# Patient Record
Sex: FEMALE | Race: WHITE
[De-identification: names, ages, dates, MRNs, and addresses within clinical notes are randomized per-mention and may not be internally consistent; named-entity substitution may affect disease eponyms.]

---

## 2017-12-15 ENCOUNTER — HOSPITAL ENCOUNTER (EMERGENCY)
Dept: HOSPITAL 17 - NEPE | Age: 47
LOS: 1 days | Discharge: HOME | End: 2017-12-16
Payer: COMMERCIAL

## 2017-12-15 VITALS
HEART RATE: 98 BPM | OXYGEN SATURATION: 99 % | SYSTOLIC BLOOD PRESSURE: 159 MMHG | RESPIRATION RATE: 18 BRPM | DIASTOLIC BLOOD PRESSURE: 97 MMHG

## 2017-12-15 VITALS — BODY MASS INDEX: 18.73 KG/M2 | WEIGHT: 99.21 LBS | HEIGHT: 61 IN

## 2017-12-15 VITALS
DIASTOLIC BLOOD PRESSURE: 82 MMHG | OXYGEN SATURATION: 99 % | SYSTOLIC BLOOD PRESSURE: 170 MMHG | TEMPERATURE: 97.7 F | RESPIRATION RATE: 16 BRPM | HEART RATE: 103 BPM

## 2017-12-15 DIAGNOSIS — F17.210: ICD-10-CM

## 2017-12-15 DIAGNOSIS — F32.9: ICD-10-CM

## 2017-12-15 DIAGNOSIS — J44.9: ICD-10-CM

## 2017-12-15 DIAGNOSIS — Z98.890: ICD-10-CM

## 2017-12-15 DIAGNOSIS — B34.9: Primary | ICD-10-CM

## 2017-12-15 DIAGNOSIS — Z86.718: ICD-10-CM

## 2017-12-15 DIAGNOSIS — F41.9: ICD-10-CM

## 2017-12-15 LAB
ALBUMIN SERPL-MCNC: 3.3 GM/DL (ref 3.4–5)
ALP SERPL-CCNC: 92 U/L (ref 45–117)
ALT SERPL-CCNC: 21 U/L (ref 10–53)
AST SERPL-CCNC: 18 U/L (ref 15–37)
BACTERIA #/AREA URNS HPF: (no result) /HPF
BASOPHILS # BLD AUTO: 0.1 TH/MM3 (ref 0–0.2)
BASOPHILS NFR BLD: 0.9 % (ref 0–2)
BILIRUB SERPL-MCNC: 0.2 MG/DL (ref 0.2–1)
BUN SERPL-MCNC: 6 MG/DL (ref 7–18)
CALCIUM SERPL-MCNC: 8.3 MG/DL (ref 8.5–10.1)
CHLORIDE SERPL-SCNC: 111 MEQ/L (ref 98–107)
COLOR UR: (no result)
CREAT SERPL-MCNC: 0.54 MG/DL (ref 0.5–1)
EOSINOPHIL # BLD: 0.2 TH/MM3 (ref 0–0.4)
EOSINOPHIL NFR BLD: 4.1 % (ref 0–4)
ERYTHROCYTE [DISTWIDTH] IN BLOOD BY AUTOMATED COUNT: 18.3 % (ref 11.6–17.2)
GFR SERPLBLD BASED ON 1.73 SQ M-ARVRAT: 122 ML/MIN (ref 89–?)
GLUCOSE SERPL-MCNC: 74 MG/DL (ref 74–106)
GLUCOSE UR STRIP-MCNC: (no result) MG/DL
HCO3 BLD-SCNC: 22.7 MEQ/L (ref 21–32)
HCT VFR BLD CALC: 35.2 % (ref 35–46)
HGB BLD-MCNC: 11.7 GM/DL (ref 11.6–15.3)
HGB UR QL STRIP: (no result)
INR PPP: 1 RATIO
KETONES UR STRIP-MCNC: (no result) MG/DL
LIPASE: 172 U/L (ref 73–393)
LYMPHOCYTES # BLD AUTO: 2 TH/MM3 (ref 1–4.8)
LYMPHOCYTES NFR BLD AUTO: 35.3 % (ref 9–44)
MCH RBC QN AUTO: 27.6 PG (ref 27–34)
MCHC RBC AUTO-ENTMCNC: 33.2 % (ref 32–36)
MCV RBC AUTO: 83.1 FL (ref 80–100)
MONOCYTE #: 0.4 TH/MM3 (ref 0–0.9)
MONOCYTES NFR BLD: 6.5 % (ref 0–8)
NEUTROPHILS # BLD AUTO: 3.1 TH/MM3 (ref 1.8–7.7)
NEUTROPHILS NFR BLD AUTO: 53.2 % (ref 16–70)
NITRITE UR QL STRIP: (no result)
PLATELET # BLD: 297 TH/MM3 (ref 150–450)
PMV BLD AUTO: 7.5 FL (ref 7–11)
PROT SERPL-MCNC: 7.4 GM/DL (ref 6.4–8.2)
PROTHROMBIN TIME: 10.1 SEC (ref 9.8–11.6)
RBC # BLD AUTO: 4.23 MIL/MM3 (ref 4–5.3)
SODIUM SERPL-SCNC: 141 MEQ/L (ref 136–145)
SP GR UR STRIP: 1.01 (ref 1–1.03)
SQUAMOUS #/AREA URNS HPF: 4 /HPF (ref 0–5)
URINE LEUKOCYTE ESTERASE: (no result)
WBC # BLD AUTO: 5.7 TH/MM3 (ref 4–11)

## 2017-12-15 PROCEDURE — 80053 COMPREHEN METABOLIC PANEL: CPT

## 2017-12-15 PROCEDURE — 99284 EMERGENCY DEPT VISIT MOD MDM: CPT

## 2017-12-15 PROCEDURE — 85025 COMPLETE CBC W/AUTO DIFF WBC: CPT

## 2017-12-15 PROCEDURE — 85730 THROMBOPLASTIN TIME PARTIAL: CPT

## 2017-12-15 PROCEDURE — 81001 URINALYSIS AUTO W/SCOPE: CPT

## 2017-12-15 PROCEDURE — 87804 INFLUENZA ASSAY W/OPTIC: CPT

## 2017-12-15 PROCEDURE — 83690 ASSAY OF LIPASE: CPT

## 2017-12-15 PROCEDURE — 71010: CPT

## 2017-12-15 PROCEDURE — 74000: CPT

## 2017-12-15 PROCEDURE — 85610 PROTHROMBIN TIME: CPT

## 2017-12-15 NOTE — PD
HPI


Chief Complaint:  Abdominal Pain


Time Seen by Provider:  21:29


Travel History


International Travel<30 days:  No


Contact w/Intl Traveler<30days:  No


Traveled to known affect area:  No





History of Present Illness


HPI


47-year-old female presents emergency department for evaluation of upper 

respiratory symptoms 2 weeks, cough, sore throat, nasal congestion.  She is 

unsure if she is had any fevers due to lack a thermometer.  She has an old 

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  She still smokes 4 

cigarettes a day.  She is also reporting abdominal pain 2 days.  Patient 

states she had a ruptured intestine in  and since that surgery she 

consistently has nausea, vomiting and diarrhea.  Patient states she has not 

eaten since pain started 2 days ago so she is unsure if eating exacerbates the 

pain.  The patient states the last time she felt this pain was when her bowel 

was perforated.  Patient denies any hematemesis or blood in her stool.  She 

denies any dysuria or hematuria.  She denies any vaginal discharge or vaginal 

bleeding.  Patient states her boyfriend was recently diagnosed with hepatitis C 

and she is very concerned that she may be infected.





PFSH


Past Medical History


Hx Anticoagulant Therapy:  No


Autoimmune Disease:  No


Anxiety:  Yes


Depression:  Yes


Cancer:  No


Cardiovascular Problems:  Yes


Chemotherapy:  No


COPD:  Yes (PPD SMOKER)


Cerebrovascular Accident:  No


Diabetes:  No


Deep Vein Thrombosis:  Yes


Endocrine:  No


Immune Disorder:  No


Implanted Vascular Access Dvce:  No


Neurologic:  No


Psychiatric:  No


Respiratory:  Yes (COPD)


Immunizations Current:  Yes


Thyroid Disease:  No


Ulcer:  Yes (REPAIR IN )


Tetanus Vaccination:  > 5 Years


Influenza Vaccination:  No


Pregnant?:  Unknown


Menopausal:  Yes


:  2


Para:  2


Miscarriage:  0


Tubal Ligation:  Yes





Past Surgical History


Abdominal Surgery:  Yes


Cholecystectomy:  Yes ( removed half stomach from perforated ulcers)


Genitourinary Surgery:  Yes


Hysterectomy:  Yes ()


Thoracic Surgery:  Yes (RT MID/LOWER LOBECTOMY )


Valve Replacement:  Yes (right ivc filter, right lower lobes lung gone r/t clot)


Other Surgery:  Yes (right mid and lower lobectomy )





Social History


Alcohol Use:  Yes (rare)


Tobacco Use:  Yes ( pack a day )


Substance Use:  No





Allergies-Medications


(Allergen,Severity, Reaction):  


Coded Allergies:  


     No Known Allergies (Unverified  Adverse Reaction, Unknown, 12/15/17)


Reported Meds & Prescriptions





Reported Meds & Active Scripts


Active


Azithromycin 250 Mg Tab 250 Mg PO AS DIRECTED


     Take 2 tabs (500 mg) on day 1 then 1 tab daily x 4 days.


Tramadol (Tramadol HCl) 50 Mg Tab 50 Mg PO Q6H PRN


Ancef (Cefazolin) 1 gram Inj 1 Gm IVPB Q8H 14 Days


Hydrocodone/Acetaminophen 5 mg/325 mg 1 Tab 1 Tab PO Q4H PRN 1 Days


Ferrous Sulfate 325 Mg Tab 325 Mg PO BID@12,17 30 Days


Librium 25 mg Cap (Chlordiazepoxide) 25 Mg Cap 25 Mg PO Q8HR 1 Days


Protonix (Pantoprazole Sodium) 40 Mg Tabdr 40 Mg PO Q12HR


Reported


Zoloft (Sertraline HCl) 50 Mg Tab 50 Mg PO DAILY


Alprazolam 2 Mg Tab 2 Mg PO BID PRN


[Oxygen 2 L]     AT NIGHT


[Nebulizer]     


Advair Hfa 115/21 (Fluticasone-Salmeterol  mcg/21 mcg) Fluticasone/

Salmeterol 115/21 Inh 1 Puff PO BID








Review of Systems


Except as stated in HPI:  all other systems reviewed are Neg





Physical Exam


Narrative


GENERAL: Appears older than stated age, pale and frail 47-year-old female 

patient in no acute respiratory distress.


SKIN: Focused skin assessment warm/dry.


HEAD: Normocephalic.  Atraumatic.


EYES: No scleral icterus. No injection or drainage.


THROAT: Mild pharyngeal injection, No exudates. Airway is patent. 


ENT: Mucosa pink and moist. Airway patent. Nasal turbinates appear mildly 

hypertrophic without nasal blood, purulent drainage or septal hematoma.


NECK: Supple, trachea midline. No JVD or lymphadenopathy.


CARDIOVASCULAR: Regular rate and rhythm without murmurs, gallops, or rubs. 


RESPIRATORY: Breath sounds equal bilaterally. No accessory muscle use.


GASTROINTESTINAL: Abdomen soft, bilateral lower quadrant tenderness with 

palpation, no masses noted, nondistended. 


MUSCULOSKELETAL: No cyanosis, or edema. 


BACK: Nontender without obvious deformity. No CVA tenderness.





Data


Data


Last Documented VS





Vital Signs








  Date Time  Temp Pulse Resp B/P (MAP) Pulse Ox O2 Delivery O2 Flow Rate FiO2


 


17 04:53        


 


17 02:00  80 16  98 Room Air  


 


12/15/17 20:20 97.7       








Orders





 Orders


Complete Blood Count With Diff (12/15/17 21:47)


Comprehensive Metabolic Panel (12/15/17 21:47)


Lipase (12/15/17 21:47)


Prothrombin Time / Inr (Pt) (12/15/17 21:47)


Act Partial Throm Time (Ptt) (12/15/17 21:47)


Urinalysis - C+S If Indicated (12/15/17 21:47)


Iv Access Insert/Monitor (12/15/17 21:47)


Ecg Monitoring (12/15/17 21:47)


Sodium Chlor 0.9% 1000 Ml Inj (Ns 1000 M (12/15/17 21:47)


Influenzae A/B Antigen (12/15/17 21:48)


Chest, Single Ap (12/15/17 21:48)


Abdomen, Upright Only (12/15/17 22:02)





Labs





Laboratory Tests








Test


  12/15/17


22:00


 


White Blood Count 5.7 TH/MM3 


 


Red Blood Count 4.23 MIL/MM3 


 


Hemoglobin 11.7 GM/DL 


 


Hematocrit 35.2 % 


 


Mean Corpuscular Volume 83.1 FL 


 


Mean Corpuscular Hemoglobin 27.6 PG 


 


Mean Corpuscular Hemoglobin


Concent 33.2 % 


 


 


Red Cell Distribution Width 18.3 % 


 


Platelet Count 297 TH/MM3 


 


Mean Platelet Volume 7.5 FL 


 


Neutrophils (%) (Auto) 53.2 % 


 


Lymphocytes (%) (Auto) 35.3 % 


 


Monocytes (%) (Auto) 6.5 % 


 


Eosinophils (%) (Auto) 4.1 % 


 


Basophils (%) (Auto) 0.9 % 


 


Neutrophils # (Auto) 3.1 TH/MM3 


 


Lymphocytes # (Auto) 2.0 TH/MM3 


 


Monocytes # (Auto) 0.4 TH/MM3 


 


Eosinophils # (Auto) 0.2 TH/MM3 


 


Basophils # (Auto) 0.1 TH/MM3 


 


CBC Comment DIFF FINAL 


 


Differential Comment  


 


Prothrombin Time 10.1 SEC 


 


Prothromb Time International


Ratio 1.0 RATIO 


 


 


Activated Partial


Thromboplast Time 25.7 SEC 


 


 


Urine Color LIGHT-YELLOW 


 


Urine Turbidity HAZY 


 


Urine pH 5.5 


 


Urine Specific Gravity 1.006 


 


Urine Protein NEG mg/dL 


 


Urine Glucose (UA) NEG mg/dL 


 


Urine Ketones NEG mg/dL 


 


Urine Occult Blood TRACE 


 


Urine Nitrite NEG 


 


Urine Bilirubin NEG 


 


Urine Urobilinogen


  LESS THAN 2.0


MG/DL


 


Urine Leukocyte Esterase NEG 


 


Urine RBC 1 /hpf 


 


Urine WBC 3 /hpf 


 


Urine Squamous Epithelial


Cells 4 /hpf 


 


 


Urine Bacteria RARE /hpf 


 


Microscopic Urinalysis Comment


  CULT NOT


INDICATED


 


Blood Urea Nitrogen 6 MG/DL 


 


Creatinine 0.54 MG/DL 


 


Random Glucose 74 MG/DL 


 


Total Protein 7.4 GM/DL 


 


Albumin 3.3 GM/DL 


 


Calcium Level 8.3 MG/DL 


 


Alkaline Phosphatase 92 U/L 


 


Aspartate Amino Transf


(AST/SGOT) 18 U/L 


 


 


Alanine Aminotransferase


(ALT/SGPT) 21 U/L 


 


 


Total Bilirubin 0.2 MG/DL 


 


Sodium Level 141 MEQ/L 


 


Potassium Level 3.6 MEQ/L 


 


Chloride Level 111 MEQ/L 


 


Carbon Dioxide Level 22.7 MEQ/L 


 


Anion Gap 7 MEQ/L 


 


Estimat Glomerular Filtration


Rate 122 ML/MIN 


 


 


Lipase 172 U/L 











[facility]


Medical Decision Making


Medical Screen Exam Complete:  Yes


Emergency Medical Condition:  Yes


Differential Diagnosis


Differential diagnoses include but not limited to gastroenteritis, perforated 

bowel, chronic abdominal pain, sinusitis, influenza, bronchitis


Narrative Course


Placed on monitor, IV obtained and blood works in the lab, CBC, CMP, lipase, PT 

INR, UA ordered and pending.  Influenza ordered and pending.  Chest x-ray 

ordered and pending.  Upright abdomen x-ray ordered and pending.  1 L normal 

saline bolus given.  Blood work shows no acute abnormality, liver enzymes were 

elevated, UA shows no acute abnormality.  Dr. Campos assumes care for this 

patient.  Please see his documentation for further details and disposition.








Laboratory Tests








Test


  12/15/17


22:00


 


White Blood Count 5.7 TH/MM3 


 


Red Blood Count 4.23 MIL/MM3 


 


Hemoglobin 11.7 GM/DL 


 


Hematocrit 35.2 % 


 


Mean Corpuscular Volume 83.1 FL 


 


Mean Corpuscular Hemoglobin 27.6 PG 


 


Mean Corpuscular Hemoglobin


Concent 33.2 % 


 


 


Red Cell Distribution Width 18.3 % 


 


Platelet Count 297 TH/MM3 


 


Mean Platelet Volume 7.5 FL 


 


Neutrophils (%) (Auto) 53.2 % 


 


Lymphocytes (%) (Auto) 35.3 % 


 


Monocytes (%) (Auto) 6.5 % 


 


Eosinophils (%) (Auto) 4.1 % 


 


Basophils (%) (Auto) 0.9 % 


 


Neutrophils # (Auto) 3.1 TH/MM3 


 


Lymphocytes # (Auto) 2.0 TH/MM3 


 


Monocytes # (Auto) 0.4 TH/MM3 


 


Eosinophils # (Auto) 0.2 TH/MM3 


 


Basophils # (Auto) 0.1 TH/MM3 


 


CBC Comment DIFF FINAL 


 


Differential Comment  


 


Prothrombin Time 10.1 SEC 


 


Prothromb Time International


Ratio 1.0 RATIO 


 


 


Activated Partial


Thromboplast Time 25.7 SEC 


 


 


Urine Color LIGHT-YELLOW 


 


Urine Turbidity HAZY 


 


Urine pH 5.5 


 


Urine Specific Gravity 1.006 


 


Urine Protein NEG mg/dL 


 


Urine Glucose (UA) NEG mg/dL 


 


Urine Ketones NEG mg/dL 


 


Urine Occult Blood TRACE 


 


Urine Nitrite NEG 


 


Urine Bilirubin NEG 


 


Urine Urobilinogen


  LESS THAN 2.0


MG/DL


 


Urine Leukocyte Esterase NEG 


 


Urine RBC 1 /hpf 


 


Urine WBC 3 /hpf 


 


Urine Squamous Epithelial


Cells 4 /hpf 


 


 


Urine Bacteria RARE /hpf 


 


Microscopic Urinalysis Comment


  CULT NOT


INDICATED


 


Blood Urea Nitrogen 6 MG/DL 


 


Creatinine 0.54 MG/DL 


 


Random Glucose 74 MG/DL 


 


Total Protein 7.4 GM/DL 


 


Albumin 3.3 GM/DL 


 


Calcium Level 8.3 MG/DL 


 


Alkaline Phosphatase 92 U/L 


 


Aspartate Amino Transf


(AST/SGOT) 18 U/L 


 


 


Alanine Aminotransferase


(ALT/SGPT) 21 U/L 


 


 


Total Bilirubin 0.2 MG/DL 


 


Sodium Level 141 MEQ/L 


 


Potassium Level 3.6 MEQ/L 


 


Chloride Level 111 MEQ/L 


 


Carbon Dioxide Level 22.7 MEQ/L 


 


Anion Gap 7 MEQ/L 


 


Estimat Glomerular Filtration


Rate 122 ML/MIN 


 


 


Lipase 172 U/L 














Last Impressions








Abdomen X-Ray 12/15/17 2202 Signed





Impressions: 





 Service Date/Time:  Friday, December 15, 2017 22:16 - CONCLUSION:  1. No acute 





 findings.     Willie Duran MD 


 


Chest X-Ray 12/15/17 2148 Signed





Impressions: 





 Service Date/Time:  Friday, December 15, 2017 22:13 - CONCLUSION:  1. Healing 





 right sided rib fractures. Stable perihilar parenchymal opacities bilaterally 





 since prior examinations.     Willie Duran MD 








Scripts


Azithromycin (Azithromycin) 250 Mg Tab


250 MG PO AS DIRECTED for Infection, #6 TAB 0 Refills


   Take 2 tabs (500 mg) on day 1 then 1 tab daily x 4 days.


   Prov: Shamir Campos MD         17 


Tramadol (Tramadol) 50 Mg Tab


50 MG PO Q6H Y for PAIN, #10 TAB 0 Refills


   Prov: Shamir Campos MD         17











Eli Camarillo Dec 15, 2017 21:50

## 2017-12-15 NOTE — RADRPT
EXAM DATE/TIME:  12/15/2017 22:13 

 

[facility] COMPARISON:     

CHEST SINGLE AP, December 09, 2015, 2:50.

 

                     

INDICATIONS :     

Patient complains of shortness of breath. 

                     

 

MEDICAL HISTORY :            

Sepsis. Gerd   

 

SURGICAL HISTORY :        

Tubal ligation. Lobe resection/ Rt wrist.

 

ENCOUNTER:     

Initial                                        

 

ACUITY:     

2 days      

 

PAIN SCORE:     

0/10

 

LOCATION:      

chest 

 

FINDINGS:     

Comparison is to 2015. Remote right rib fractures and chronic parenchymal opacity in the right mid manuel
ng, presumably scarring similar to examination from 2 years ago. Linear scarring also present upper l
eft lung. No effusion or pneumothorax. Heart size normal.

 

CONCLUSION:     

1. Healing right sided rib fractures. Stable perihilar parenchymal opacities bilaterally since prior 
examinations.

 

 

 

 Willie Duran MD on December 15, 2017 at 22:42           

Board Certified Radiologist.

 This report was verified electronically.

## 2017-12-15 NOTE — RADRPT
EXAM DATE/TIME:  12/15/2017 22:16 

 

[facility] COMPARISON:     

No previous studies available for comparison.

 

                     

INDICATIONS :     

Patient complains of upper abdomen.

                     

 

MEDICAL HISTORY :     

None.          

 

SURGICAL HISTORY :     

None.   

 

ENCOUNTER:     

Initial                                        

 

ACUITY:     

1 day      

 

PAIN SCORE:     

4/10

 

LOCATION:       

Abdomen

 

FINDINGS:     

A single erect view of the abdomen demonstrates the lower lungs to be clear. Multiple healing right r
ib fractures. Inferior vena cava filter noted.

 

CONCLUSION:     

1. No acute findings.

 

 

 

 Willie Duran MD on December 15, 2017 at 22:44           

Board Certified Radiologist.

 This report was verified electronically.

## 2017-12-16 VITALS
DIASTOLIC BLOOD PRESSURE: 78 MMHG | HEART RATE: 80 BPM | OXYGEN SATURATION: 98 % | SYSTOLIC BLOOD PRESSURE: 147 MMHG | RESPIRATION RATE: 16 BRPM

## 2017-12-16 NOTE — PD
Physical Exam


Date Seen by Provider:  Dec 16, 2017


Time Seen by Provider:  12:30


Narrative


Patient has lower left quadrant pain as well as upper congestion and flulike 

symptoms patient's flu was negative she was seen by the PA I am discharging the 

patient with a Z-Ladarius and Tylenol and a few tramadol for her pain and she is 

going to follow-up as outpatient.





Data


Data


Last Documented VS





Vital Signs








  Date Time  Temp Pulse Resp B/P (MAP) Pulse Ox O2 Delivery O2 Flow Rate FiO2


 


12/16/17 04:53        


 


12/16/17 02:00  80 16  98 Room Air  


 


12/15/17 20:20 97.7       








Orders





 Orders


Complete Blood Count With Diff (12/15/17 21:47)


Comprehensive Metabolic Panel (12/15/17 21:47)


Lipase (12/15/17 21:47)


Prothrombin Time / Inr (Pt) (12/15/17 21:47)


Act Partial Throm Time (Ptt) (12/15/17 21:47)


Urinalysis - C+S If Indicated (12/15/17 21:47)


Iv Access Insert/Monitor (12/15/17 21:47)


Ecg Monitoring (12/15/17 21:47)


Sodium Chlor 0.9% 1000 Ml Inj (Ns 1000 M (12/15/17 21:47)


Influenzae A/B Antigen (12/15/17 21:48)


Chest, Single Ap (12/15/17 21:48)


Abdomen, Upright Only (12/15/17 22:02)





Labs





Laboratory Tests








Test


  12/15/17


22:00


 


White Blood Count 5.7 TH/MM3 


 


Red Blood Count 4.23 MIL/MM3 


 


Hemoglobin 11.7 GM/DL 


 


Hematocrit 35.2 % 


 


Mean Corpuscular Volume 83.1 FL 


 


Mean Corpuscular Hemoglobin 27.6 PG 


 


Mean Corpuscular Hemoglobin


Concent 33.2 % 


 


 


Red Cell Distribution Width 18.3 % 


 


Platelet Count 297 TH/MM3 


 


Mean Platelet Volume 7.5 FL 


 


Neutrophils (%) (Auto) 53.2 % 


 


Lymphocytes (%) (Auto) 35.3 % 


 


Monocytes (%) (Auto) 6.5 % 


 


Eosinophils (%) (Auto) 4.1 % 


 


Basophils (%) (Auto) 0.9 % 


 


Neutrophils # (Auto) 3.1 TH/MM3 


 


Lymphocytes # (Auto) 2.0 TH/MM3 


 


Monocytes # (Auto) 0.4 TH/MM3 


 


Eosinophils # (Auto) 0.2 TH/MM3 


 


Basophils # (Auto) 0.1 TH/MM3 


 


CBC Comment DIFF FINAL 


 


Differential Comment  


 


Prothrombin Time 10.1 SEC 


 


Prothromb Time International


Ratio 1.0 RATIO 


 


 


Activated Partial


Thromboplast Time 25.7 SEC 


 


 


Urine Color LIGHT-YELLOW 


 


Urine Turbidity HAZY 


 


Urine pH 5.5 


 


Urine Specific Gravity 1.006 


 


Urine Protein NEG mg/dL 


 


Urine Glucose (UA) NEG mg/dL 


 


Urine Ketones NEG mg/dL 


 


Urine Occult Blood TRACE 


 


Urine Nitrite NEG 


 


Urine Bilirubin NEG 


 


Urine Urobilinogen


  LESS THAN 2.0


MG/DL


 


Urine Leukocyte Esterase NEG 


 


Urine RBC 1 /hpf 


 


Urine WBC 3 /hpf 


 


Urine Squamous Epithelial


Cells 4 /hpf 


 


 


Urine Bacteria RARE /hpf 


 


Microscopic Urinalysis Comment


  CULT NOT


INDICATED


 


Blood Urea Nitrogen 6 MG/DL 


 


Creatinine 0.54 MG/DL 


 


Random Glucose 74 MG/DL 


 


Total Protein 7.4 GM/DL 


 


Albumin 3.3 GM/DL 


 


Calcium Level 8.3 MG/DL 


 


Alkaline Phosphatase 92 U/L 


 


Aspartate Amino Transf


(AST/SGOT) 18 U/L 


 


 


Alanine Aminotransferase


(ALT/SGPT) 21 U/L 


 


 


Total Bilirubin 0.2 MG/DL 


 


Sodium Level 141 MEQ/L 


 


Potassium Level 3.6 MEQ/L 


 


Chloride Level 111 MEQ/L 


 


Carbon Dioxide Level 22.7 MEQ/L 


 


Anion Gap 7 MEQ/L 


 


Estimat Glomerular Filtration


Rate 122 ML/MIN 


 


 


Lipase 172 U/L 











MDM


Supervised Visit with ALEX:  Yes


Diagnosis





 Primary Impression:  


 Viral syndrome


 Additional Impression:  


 Abdominal pain


Scripts


Azithromycin (Azithromycin) 250 Mg Tab


250 MG PO AS DIRECTED for Infection, #6 TAB 0 Refills


   Take 2 tabs (500 mg) on day 1 then 1 tab daily x 4 days.


   Prov: Shamir Campos MD         12/16/17 


Tramadol (Tramadol) 50 Mg Tab


50 MG PO Q6H Y for PAIN, #10 TAB 0 Refills


   Prov: Shamir Campos MD         12/16/17











Shamir Campos MD Dec 16, 2017 05:16

## 2018-05-07 ENCOUNTER — HOSPITAL ENCOUNTER (EMERGENCY)
Dept: HOSPITAL 17 - NEPE | Age: 48
Discharge: HOME | End: 2018-05-07
Payer: COMMERCIAL

## 2018-05-07 VITALS
OXYGEN SATURATION: 98 % | TEMPERATURE: 97.9 F | RESPIRATION RATE: 18 BRPM | SYSTOLIC BLOOD PRESSURE: 188 MMHG | HEART RATE: 87 BPM | DIASTOLIC BLOOD PRESSURE: 101 MMHG

## 2018-05-07 VITALS
DIASTOLIC BLOOD PRESSURE: 86 MMHG | RESPIRATION RATE: 18 BRPM | HEART RATE: 82 BPM | SYSTOLIC BLOOD PRESSURE: 174 MMHG | OXYGEN SATURATION: 99 %

## 2018-05-07 VITALS — BODY MASS INDEX: 20.81 KG/M2 | WEIGHT: 110.23 LBS | HEIGHT: 61 IN

## 2018-05-07 DIAGNOSIS — J44.9: ICD-10-CM

## 2018-05-07 DIAGNOSIS — F32.9: ICD-10-CM

## 2018-05-07 DIAGNOSIS — B96.20: ICD-10-CM

## 2018-05-07 DIAGNOSIS — N39.0: ICD-10-CM

## 2018-05-07 DIAGNOSIS — I10: ICD-10-CM

## 2018-05-07 DIAGNOSIS — K85.90: Primary | ICD-10-CM

## 2018-05-07 DIAGNOSIS — F17.210: ICD-10-CM

## 2018-05-07 LAB
ALBUMIN SERPL-MCNC: 3.5 GM/DL (ref 3.4–5)
ALP SERPL-CCNC: 131 U/L (ref 45–117)
ALT SERPL-CCNC: 32 U/L (ref 10–53)
AST SERPL-CCNC: 67 U/L (ref 15–37)
BACTERIA #/AREA URNS HPF: (no result) /HPF
BASOPHILS # BLD AUTO: 0 TH/MM3 (ref 0–0.2)
BASOPHILS NFR BLD: 0.2 % (ref 0–2)
BILIRUB SERPL-MCNC: 0.3 MG/DL (ref 0.2–1)
BUN SERPL-MCNC: 10 MG/DL (ref 7–18)
CALCIUM SERPL-MCNC: 7.9 MG/DL (ref 8.5–10.1)
CHLORIDE SERPL-SCNC: 104 MEQ/L (ref 98–107)
COLOR UR: (no result)
CREAT SERPL-MCNC: 0.66 MG/DL (ref 0.5–1)
EOSINOPHIL # BLD: 0 TH/MM3 (ref 0–0.4)
EOSINOPHIL NFR BLD: 0.4 % (ref 0–4)
ERYTHROCYTE [DISTWIDTH] IN BLOOD BY AUTOMATED COUNT: 18.4 % (ref 11.6–17.2)
GFR SERPLBLD BASED ON 1.73 SQ M-ARVRAT: 96 ML/MIN (ref 89–?)
GLUCOSE SERPL-MCNC: 91 MG/DL (ref 74–106)
GLUCOSE UR STRIP-MCNC: (no result) MG/DL
HCO3 BLD-SCNC: 25.2 MEQ/L (ref 21–32)
HCT VFR BLD CALC: 40.2 % (ref 35–46)
HGB BLD-MCNC: 13.4 GM/DL (ref 11.6–15.3)
HGB UR QL STRIP: (no result)
HYALINE CASTS #/AREA URNS LPF: 2 /LPF
INR PPP: 1.1 RATIO
KETONES UR STRIP-MCNC: (no result) MG/DL
LYMPHOCYTES # BLD AUTO: 0.9 TH/MM3 (ref 1–4.8)
LYMPHOCYTES NFR BLD AUTO: 9.1 % (ref 9–44)
MCH RBC QN AUTO: 29.6 PG (ref 27–34)
MCHC RBC AUTO-ENTMCNC: 33.3 % (ref 32–36)
MCV RBC AUTO: 88.7 FL (ref 80–100)
MONOCYTE #: 0.6 TH/MM3 (ref 0–0.9)
MONOCYTES NFR BLD: 5.5 % (ref 0–8)
MUCOUS THREADS #/AREA URNS LPF: (no result) /LPF
NEUTROPHILS # BLD AUTO: 8.5 TH/MM3 (ref 1.8–7.7)
NEUTROPHILS NFR BLD AUTO: 84.8 % (ref 16–70)
NITRITE UR QL STRIP: (no result)
PLATELET # BLD: 259 TH/MM3 (ref 150–450)
PMV BLD AUTO: 8.1 FL (ref 7–11)
PROT SERPL-MCNC: 7.2 GM/DL (ref 6.4–8.2)
PROTHROMBIN TIME: 11.2 SEC (ref 9.8–11.6)
RBC # BLD AUTO: 4.53 MIL/MM3 (ref 4–5.3)
SODIUM SERPL-SCNC: 140 MEQ/L (ref 136–145)
SP GR UR STRIP: 1.01 (ref 1–1.03)
SQUAMOUS #/AREA URNS HPF: 1 /HPF (ref 0–5)
URINE LEUKOCYTE ESTERASE: (no result)
WBC # BLD AUTO: 10 TH/MM3 (ref 4–11)

## 2018-05-07 PROCEDURE — 85730 THROMBOPLASTIN TIME PARTIAL: CPT

## 2018-05-07 PROCEDURE — 74018 RADEX ABDOMEN 1 VIEW: CPT

## 2018-05-07 PROCEDURE — 93005 ELECTROCARDIOGRAM TRACING: CPT

## 2018-05-07 PROCEDURE — 74177 CT ABD & PELVIS W/CONTRAST: CPT

## 2018-05-07 PROCEDURE — 96361 HYDRATE IV INFUSION ADD-ON: CPT

## 2018-05-07 PROCEDURE — 83690 ASSAY OF LIPASE: CPT

## 2018-05-07 PROCEDURE — 81001 URINALYSIS AUTO W/SCOPE: CPT

## 2018-05-07 PROCEDURE — 99285 EMERGENCY DEPT VISIT HI MDM: CPT

## 2018-05-07 PROCEDURE — 96375 TX/PRO/DX INJ NEW DRUG ADDON: CPT

## 2018-05-07 PROCEDURE — C9113 INJ PANTOPRAZOLE SODIUM, VIA: HCPCS

## 2018-05-07 PROCEDURE — 83605 ASSAY OF LACTIC ACID: CPT

## 2018-05-07 PROCEDURE — 85610 PROTHROMBIN TIME: CPT

## 2018-05-07 PROCEDURE — 85025 COMPLETE CBC W/AUTO DIFF WBC: CPT

## 2018-05-07 PROCEDURE — 84703 CHORIONIC GONADOTROPIN ASSAY: CPT

## 2018-05-07 PROCEDURE — 87086 URINE CULTURE/COLONY COUNT: CPT

## 2018-05-07 PROCEDURE — 80053 COMPREHEN METABOLIC PANEL: CPT

## 2018-05-07 PROCEDURE — 96374 THER/PROPH/DIAG INJ IV PUSH: CPT

## 2018-05-07 NOTE — RADRPT
EXAM DATE/TIME:  05/07/2018 12:33 

 

[facility] COMPARISON:     

CT ABDOMEN & PELVIS W CONTRAST, December 07, 2015, 18:10.

 

 

INDICATIONS :     

Abdominal pain and nausea.

                      

 

IV CONTRAST:     

80 cc Omnipaque 350 (iohexol) IV 

 

 

ORAL CONTRAST:      

No oral contrast ingested.

                      

 

RADIATION DOSE:     

4.52 CTDIvol (mGy) 

 

 

MEDICAL HISTORY :     

Chronic obstructive pulmonary disease.  Perforated bowel.

 

SURGICAL HISTORY :      

Tubal ligation. Hysterectomy.Partial gastrectomy.

 

ENCOUNTER:      

Initial

 

ACUITY:      

1 day

 

PAIN SCALE:      

8/10

 

LOCATION:         

abdomen

 

TECHNIQUE:     

Volumetric scanning of the abdomen and pelvis was performed.  Using automated exposure control and ad
justment of the mA and/or kV according to patient size, radiation dose was kept as low as reasonably 
achievable to obtain optimal diagnostic quality images.  DICOM format image data is available electro
nically for review and comparison.  

 

FINDINGS:     

 

LOWER LUNGS:     

The visualized lower lungs are clear.

 

LIVER:     

The liver demonstrates a diffuse decreased attenuation. The liver appears mildly enlarged. No focal h
epatic lesions are seen. There is intrahepatic and extra hepatic biliary duct dilatation likely refle
cting a reservoir phenomenon following cholecystectomy. This appearance is unchanged.

 

SPLEEN:     

Normal size without lesion.

 

PANCREAS:     

Within normal limits.

 

KIDNEYS:     

There is low-density seen at the head and uncinate process. There is induration of the fat surroundin
g the pancreatic head and uncinate process. The pancreatic body and tail are grossly intact.

 

ADRENAL GLANDS:     

Within normal limits.

 

VASCULAR:     

There is no aortic aneurysm. There is an IVC filter present.

 

BOWEL/MESENTERY:     

Bowel anastomosis sutures are seen in the midabdomen in the small bowel. The patient is status post d
istal gastrectomy. There some induration around the duodenum likely related to the pancreatic process
.

 

ABDOMINAL WALL:     

Within normal limits.

 

RETROPERITONEUM:     

There is no lymphadenopathy. 

 

BLADDER:     

No wall thickening or mass. 

 

REPRODUCTIVE:     

Within normal limits.

 

INGUINAL:     

There is no lymphadenopathy or hernia. 

 

MUSCULOSKELETAL:     

Within normal limits for patient age. 

 

CONCLUSION:     

1. Low density pancreatic head and uncinate process with surrounding induration likely from pancreati
tis.

2. Status post distal gastrectomy. Bowel anastomosis sutures are seen in the small bowel in the left 
lower quadrant.

3. Hepatic steatosis.

 

 

 

 Jaden Hewitt MD on May 07, 2018 at 12:50           

Board Certified Radiologist.

 This report was verified electronically.

## 2018-05-07 NOTE — PD
HPI


Chief Complaint:  Abdominal Pain


Time Seen by Provider:  11:01


Travel History


International Travel<30 days:  No


Contact w/Intl Traveler<30days:  No


Traveled to known affect area:  No





History of Present Illness


HPI


This is a 47-year-old female who presents via EMS for evaluation of abdominal 

pain.  Symptoms started suddenly at 5 AM.  The pain is sharp, constant, 

primarily in the epigastrium, associated nausea and vomiting.  Last bowel 

movement yesterday.  She reports a history of perforated gastrojejunal 

anastomosis in  and she reports that this feels similar.  She denies cough, 

congestion, chest pain, shortness of breath, dysuria, flank pain, hematuria.  

She reports that she continues to use [employer] powder on a regular basis 

although she was told to avoid NSAIDs in the past.  No other complaints.





PFSH


Past Medical History


Hx Anticoagulant Therapy:  No


Autoimmune Disease:  No


Anxiety:  Yes


Depression:  Yes


Cancer:  No


Cardiovascular Problems:  Yes (HTN)


Chemotherapy:  No


COPD:  Yes (PPD SMOKER)


Cerebrovascular Accident:  No


Diabetes:  No


Deep Vein Thrombosis:  Yes


Endocrine:  No


Immune Disorder:  No


Implanted Vascular Access Dvce:  No


Neurologic:  No


Psychiatric:  No


Respiratory:  Yes (COPD)


Immunizations Current:  Yes


Thyroid Disease:  No


Ulcer:  Yes (REPAIR IN )


Menopausal:  Yes


:  2


Para:  2


Miscarriage:  0


Tubal Ligation:  Yes





Past Surgical History


Abdominal Surgery:  Yes


Cholecystectomy:  Yes ( removed half stomach from perforated ulcers)


Genitourinary Surgery:  Yes


Hysterectomy:  Yes ()


Thoracic Surgery:  Yes (RT MID/LOWER LOBECTOMY )


Valve Replacement:  Yes (right ivc filter, right lower lobes lung gone r/t clot)


Other Surgery:  Yes (right mid and lower lobectomy )





Social History


Alcohol Use:  Yes (rare)


Tobacco Use:  Yes ( pack a day )


Substance Use:  No





Allergies-Medications


(Allergen,Severity, Reaction):  


Coded Allergies:  


     No Known Allergies (Unverified  Adverse Reaction, Unknown, 12/15/17)


Reported Meds & Prescriptions





Reported Meds & Active Scripts


Active


Zofran (Ondansetron HCl) 4 Mg Tab 4 Mg PO Q6HR PRN


Macrobid (Nitrofurantoin Monoh/Nitrofur Macro) 100 Mg Cap 100 Mg PO BID 7 Days


Azithromycin 250 Mg Tab 250 Mg PO AS DIRECTED


     Take 2 tabs (500 mg) on day 1 then 1 tab daily x 4 days.


Tramadol (Tramadol HCl) 50 Mg Tab 50 Mg PO Q6H PRN


Ancef (Cefazolin) 1 gram Inj 1 Gm IVPB Q8H 14 Days


Hydrocodone/Acetaminophen 5 mg/325 mg 1 Tab 1 Tab PO Q4H PRN 1 Days


Ferrous Sulfate 325 Mg Tab 325 Mg PO BID@12,17 30 Days


Librium 25 mg Cap (Chlordiazepoxide) 25 Mg Cap 25 Mg PO Q8HR 1 Days


Protonix (Pantoprazole Sodium) 40 Mg Tabdr 40 Mg PO Q12HR


Reported


Zoloft (Sertraline HCl) 50 Mg Tab 50 Mg PO DAILY


Alprazolam 2 Mg Tab 2 Mg PO BID PRN


[Oxygen 2 L]     AT NIGHT


[Nebulizer]     


Advair Hfa 115/21 (Fluticasone-Salmeterol  mcg/21 mcg) Fluticasone/

Salmeterol 115/21 Inh 1 Puff PO BID








Review of Systems


Except as stated in HPI:  all other systems reviewed are Neg





Physical Exam


Narrative


GENERAL: This is a well-developed well-nourished female who appears 

uncomfortable on examination.


SKIN: Warm and dry.  Abdominal surgical scars noted.


HEAD: Atraumatic. Normocephalic. 


EYES: Pupils equal and round. No scleral icterus. No injection or drainage. 


ENT: No nasal bleeding or discharge.  Mucous membranes pink and moist.


NECK: Trachea midline. No JVD. 


CARDIOVASCULAR: Regular rate and rhythm.  No murmur appreciated.


RESPIRATORY: No accessory muscle use. Clear to auscultation. Breath sounds 

equal bilaterally. 


GASTROINTESTINAL: Diffuse abdominal tenderness to palpation with some guarding.

  Bowel sounds are present in all 4 quadrants.


MUSCULOSKELETAL: No obvious deformities. No clubbing.  No cyanosis.  No edema. 


NEUROLOGICAL: Awake and alert. No obvious cranial nerve deficits.  Motor 

grossly within normal limits. Normal speech.





Data


Data


Last Documented VS





Vital Signs








  Date Time  Temp Pulse Resp B/P (MAP) Pulse Ox O2 Delivery O2 Flow Rate FiO2


 


18 11:31  82 18 174/86 (115) 99 Room Air  


 


18 10:31 97.9       








Orders





 Orders


Complete Blood Count With Diff (18 11:15)


Comprehensive Metabolic Panel (18 11:15)


Lipase (18 11:15)


Lactic Acid (18 11:15)


Prothrombin Time / Inr (Pt) (18 11:15)


Act Partial Throm Time (Ptt) (18 11:15)


Urinalysis - C+S If Indicated (18 11:15)


Ct Abd/Pel W Iv Contrast(Rout) (18 11:15)


Iv Access Insert/Monitor (18 11:15)


Ecg Monitoring (18 11:15)


Oximetry (18 11:15)


Morphine Inj (Morphine Inj) (18 11:15)


Ondansetron Inj (Zofran Inj) (18 11:15)


Sodium Chlor 0.9% 1000 Ml Inj (Ns 1000 M (18 11:15)


Sodium Chloride 0.9% Flush (Ns Flush) (18 11:15)


Electrocardiogram (18 11:15)


Abdomen, Upright Only (18 11:15)


Pantoprazole Inj (Protonix Inj) (18 11:30)


Ed Urine Pregnancytest Poc (18 11:42)


Urine Culture (18 11:25)


Iohexol 350 Inj (Omnipaque 350 Inj) (18 12:48)


Ceftriaxone Inj (Rocephin Inj) (18 13:15)


Ed Discharge Order (18 13:25)





Labs





Laboratory Tests








Test


  18


11:21 18


11:25 18


13:23


 


White Blood Count 10.0 TH/MM3   


 


Red Blood Count 4.53 MIL/MM3   


 


Hemoglobin 13.4 GM/DL   


 


Hematocrit 40.2 %   


 


Mean Corpuscular Volume 88.7 FL   


 


Mean Corpuscular Hemoglobin 29.6 PG   


 


Mean Corpuscular Hemoglobin


Concent 33.3 % 


  


  


 


 


Red Cell Distribution Width 18.4 %   


 


Platelet Count 259 TH/MM3   


 


Mean Platelet Volume 8.1 FL   


 


Neutrophils (%) (Auto) 84.8 %   


 


Lymphocytes (%) (Auto) 9.1 %   


 


Monocytes (%) (Auto) 5.5 %   


 


Eosinophils (%) (Auto) 0.4 %   


 


Basophils (%) (Auto) 0.2 %   


 


Neutrophils # (Auto) 8.5 TH/MM3   


 


Lymphocytes # (Auto) 0.9 TH/MM3   


 


Monocytes # (Auto) 0.6 TH/MM3   


 


Eosinophils # (Auto) 0.0 TH/MM3   


 


Basophils # (Auto) 0.0 TH/MM3   


 


CBC Comment DIFF FINAL   


 


Differential Comment    


 


Prothrombin Time 11.2 SEC   


 


Prothromb Time International


Ratio 1.1 RATIO 


  


  


 


 


Activated Partial


Thromboplast Time 27.3 SEC 


  


  


 


 


Blood Urea Nitrogen 10 MG/DL   


 


Creatinine 0.66 MG/DL   


 


Random Glucose 91 MG/DL   


 


Total Protein 7.2 GM/DL   


 


Albumin 3.5 GM/DL   


 


Calcium Level 7.9 MG/DL   


 


Alkaline Phosphatase 131 U/L   


 


Aspartate Amino Transf


(AST/SGOT) 67 U/L 


  


  


 


 


Alanine Aminotransferase


(ALT/SGPT) 32 U/L 


  


  


 


 


Total Bilirubin 0.3 MG/DL   


 


Sodium Level 140 MEQ/L   


 


Potassium Level 3.9 MEQ/L   


 


Chloride Level 104 MEQ/L   


 


Carbon Dioxide Level 25.2 MEQ/L   


 


Anion Gap 11 MEQ/L   


 


Estimat Glomerular Filtration


Rate 96 ML/MIN 


  


  


 


 


Lipase 896 U/L   


 


Urine Color  LIGHT-YELLOW  


 


Urine Turbidity  CLEAR  


 


Urine pH  5.0  


 


Urine Specific Gravity  1.014  


 


Urine Protein  NEG mg/dL  


 


Urine Glucose (UA)  NEG mg/dL  


 


Urine Ketones  NEG mg/dL  


 


Urine Occult Blood  MOD  


 


Urine Nitrite  POS  


 


Urine Bilirubin  NEG  


 


Urine Urobilinogen


  


  LESS THAN 2.0


MG/DL 


 


 


Urine Leukocyte Esterase  MOD  


 


Urine RBC  9 /hpf  


 


Urine WBC  52 /hpf  


 


Urine Squamous Epithelial


Cells 


  1 /hpf 


  


 


 


Urine Bacteria  RARE /hpf  


 


Urine Hyaline Casts  2 /lpf  


 


Urine Mucus  FEW /lpf  


 


Microscopic Urinalysis Comment


  


  CULTURE


INDICATED 


 











MDM


Medical Decision Making


Medical Screen Exam Complete:  Yes


Emergency Medical Condition:  Yes


Medical Record Reviewed:  Yes


Differential Diagnosis


Pancreatitis, perforation, obstruction, diverticulitis, colitis, pyelonephritis


Narrative Course


Lab work, CT abdomen pelvis have been ordered.  The patient was given morphine 

and Zofran and fluids.


CT abdomen pelvis reveals


CONCLUSION:     


1. Low density pancreatic head and uncinate process with surrounding induration 

likely from pancreatitis.


2. Status post distal gastrectomy. Bowel anastomosis sutures are seen in the 

small bowel in the left lower quadrant.


3. Hepatic steatosis.





CMP reveals no acute abnormality's, lipase is elevated at 96 consistent with CT 

findings of pancreatitis.  Urinalysis is suggestive of UTI with nitrites, 

numerous WBCs.  Rocephin initiated.  Upon reexamination the patient does feel 

significantly improved, her pain is currently minimal.  She denies history of 

pancreatitis, she does occasionally drink.  Yesterday she had a mixed drink.  

At this point time the plan is to treat her as an outpatient with Macrobid and 

Zofran.  She understands to return for any new or worsening symptoms.  She is 

stable for discharge.





Diagnosis





 Primary Impression:  


 Pancreatitis


 Additional Impression:  


 UTI (urinary tract infection)





***Additional Instructions:  


Liquid diet for the next 1-2 days and slowly advance as tolerated.  Medication 

as prescribed.  Follow-up with primary care physician this week and return for 

any acutely new or worsening symptoms.


***Med/Other Pt SpecificInfo:  Prescription(s) given


Scripts


Ondansetron (Zofran) 4 Mg Tab


4 MG PO Q6HR Y for NAUSEA OR VOMITING, #20 TAB 0 Refills


   Prov: Kerry Marx MD         18 


Nitrofurantoin Monohydrate Macrocrystals (Macrobid) 100 Mg Cap


100 MG PO BID for Infection for 7 Days, #14 CAP 0 Refills


   Prov: Kerry Marx MD         18


Disposition:  01 DISCHARGE HOME


Condition:  Stable











Jeremiah Hu May 7, 2018 11:19

## 2018-05-07 NOTE — RADRPT
EXAM DATE/TIME:  05/07/2018 11:48 

 

[facility] COMPARISON:     

No previous studies available for comparison.

 

                     

INDICATIONS :     

Abdominal pain and vomiting.

                     

 

MEDICAL HISTORY :     

None.          

 

SURGICAL HISTORY :     

Cholecystectomy.   Stomach surgery.

 

ENCOUNTER:     

Initial                                        

 

ACUITY:     

1 day      

 

PAIN SCORE:     

10/10

 

LOCATION:     

Bilateral  abdomen.

 

FINDINGS:     

There is no evidence of pneumoperitoneum. There is less intestinal gas pattern is nonspecific and anusha
ign. An IVC filter is present. Patchy parenchymal opacities in the visualized lower lung fields and p
osterolateral right-sided rib fractures noted.

 

CONCLUSION:     

No evidence of pneumoperitoneum

 

 

 

 Jaden Sanchez MD on May 07, 2018 at 11:54           

Board Certified Radiologist.

 This report was verified electronically.

## 2018-05-07 NOTE — PD
Data


Data


Last Documented VS





Vital Signs








  Date Time  Temp Pulse Resp B/P (MAP) Pulse Ox O2 Delivery O2 Flow Rate FiO2


 


5/7/18 11:31  82 18 174/86 (115) 99 Room Air  


 


5/7/18 10:31 97.9       








Orders





 Orders


Complete Blood Count With Diff (5/7/18 11:15)


Comprehensive Metabolic Panel (5/7/18 11:15)


Lipase (5/7/18 11:15)


Lactic Acid (5/7/18 11:15)


Prothrombin Time / Inr (Pt) (5/7/18 11:15)


Act Partial Throm Time (Ptt) (5/7/18 11:15)


Urinalysis - C+S If Indicated (5/7/18 11:15)


Ct Abd/Pel W Iv Contrast(Rout) (5/7/18 11:15)


Iv Access Insert/Monitor (5/7/18 11:15)


Ecg Monitoring (5/7/18 11:15)


Oximetry (5/7/18 11:15)


Morphine Inj (Morphine Inj) (5/7/18 11:15)


Ondansetron Inj (Zofran Inj) (5/7/18 11:15)


Sodium Chlor 0.9% 1000 Ml Inj (Ns 1000 M (5/7/18 11:15)


Sodium Chloride 0.9% Flush (Ns Flush) (5/7/18 11:15)


Electrocardiogram (5/7/18 11:15)


Abdomen, Upright Only (5/7/18 11:15)


Pantoprazole Inj (Protonix Inj) (5/7/18 11:30)


Ed Urine Pregnancytest Poc (5/7/18 11:42)


Urine Culture (5/7/18 11:25)


Iohexol 350 Inj (Omnipaque 350 Inj) (5/7/18 12:48)


Ceftriaxone Inj (Rocephin Inj) (5/7/18 13:15)


Ed Discharge Order (5/7/18 13:25)





Labs





Laboratory Tests








Test


  5/7/18


11:21 5/7/18


11:25 5/7/18


13:23


 


White Blood Count 10.0 TH/MM3   


 


Red Blood Count 4.53 MIL/MM3   


 


Hemoglobin 13.4 GM/DL   


 


Hematocrit 40.2 %   


 


Mean Corpuscular Volume 88.7 FL   


 


Mean Corpuscular Hemoglobin 29.6 PG   


 


Mean Corpuscular Hemoglobin


Concent 33.3 % 


  


  


 


 


Red Cell Distribution Width 18.4 %   


 


Platelet Count 259 TH/MM3   


 


Mean Platelet Volume 8.1 FL   


 


Neutrophils (%) (Auto) 84.8 %   


 


Lymphocytes (%) (Auto) 9.1 %   


 


Monocytes (%) (Auto) 5.5 %   


 


Eosinophils (%) (Auto) 0.4 %   


 


Basophils (%) (Auto) 0.2 %   


 


Neutrophils # (Auto) 8.5 TH/MM3   


 


Lymphocytes # (Auto) 0.9 TH/MM3   


 


Monocytes # (Auto) 0.6 TH/MM3   


 


Eosinophils # (Auto) 0.0 TH/MM3   


 


Basophils # (Auto) 0.0 TH/MM3   


 


CBC Comment DIFF FINAL   


 


Differential Comment    


 


Prothrombin Time 11.2 SEC   


 


Prothromb Time International


Ratio 1.1 RATIO 


  


  


 


 


Activated Partial


Thromboplast Time 27.3 SEC 


  


  


 


 


Blood Urea Nitrogen 10 MG/DL   


 


Creatinine 0.66 MG/DL   


 


Random Glucose 91 MG/DL   


 


Total Protein 7.2 GM/DL   


 


Albumin 3.5 GM/DL   


 


Calcium Level 7.9 MG/DL   


 


Alkaline Phosphatase 131 U/L   


 


Aspartate Amino Transf


(AST/SGOT) 67 U/L 


  


  


 


 


Alanine Aminotransferase


(ALT/SGPT) 32 U/L 


  


  


 


 


Total Bilirubin 0.3 MG/DL   


 


Sodium Level 140 MEQ/L   


 


Potassium Level 3.9 MEQ/L   


 


Chloride Level 104 MEQ/L   


 


Carbon Dioxide Level 25.2 MEQ/L   


 


Anion Gap 11 MEQ/L   


 


Estimat Glomerular Filtration


Rate 96 ML/MIN 


  


  


 


 


Lipase 896 U/L   


 


Urine Color  LIGHT-YELLOW  


 


Urine Turbidity  CLEAR  


 


Urine pH  5.0  


 


Urine Specific Gravity  1.014  


 


Urine Protein  NEG mg/dL  


 


Urine Glucose (UA)  NEG mg/dL  


 


Urine Ketones  NEG mg/dL  


 


Urine Occult Blood  MOD  


 


Urine Nitrite  POS  


 


Urine Bilirubin  NEG  


 


Urine Urobilinogen


  


  LESS THAN 2.0


MG/DL 


 


 


Urine Leukocyte Esterase  MOD  


 


Urine RBC  9 /hpf  


 


Urine WBC  52 /hpf  


 


Urine Squamous Epithelial


Cells 


  1 /hpf 


  


 


 


Urine Bacteria  RARE /hpf  


 


Urine Hyaline Casts  2 /lpf  


 


Urine Mucus  FEW /lpf  


 


Microscopic Urinalysis Comment


  


  CULTURE


INDICATED 


 


 


Lactic Acid Level   0.8 mmol/L 











MDM


Supervised Visit with ALEX:  Yes


Narrative Course


The history, exam, and medical decision-making in the associated midlevel 

provider note were completed with my assistance. I reviewed and agree with the 

findings presented.  I attest that I had a face-to-face encounter with the 

patient on the same day, and personally performed and documented my assessment 

and findings in the medical record. 





*My assessment and Findings: This is a 47-year-old female who has a history of 

perforated gastrojejunostomy and alcohol abuse who presents to the emergency 

department with abdominal pain.  Labs demonstrate acute pancreatitis.  She is 

nontoxic appearing.  CT abdomen pelvis is reassuring with no evidence of 

pseudocyst or bowel perforation.  Patient was discharged on pain control.


Diagnosis





 Primary Impression:  


 Pancreatitis


 Additional Impression:  


 UTI (urinary tract infection)


Patient Instructions:  General Instructions


Departure Forms:  Tests/Procedures





***Additional Instruction:  


Liquid diet for the next 1-2 days and slowly advance as tolerated.  Medication 

as prescribed.  Follow-up with primary care physician this week and return for 

any acutely new or worsening symptoms.


Scripts


Hydrocodone-Acetaminophen (Hydrocodone-Acetaminophen) 5-325 mg Tab


1 TAB PO Q6H Y for PAIN, #15 TAB 0 Refills


   Prov: Kerry Marx MD         5/7/18 


Ondansetron (Zofran) 4 Mg Tab


4 MG PO Q6HR Y for NAUSEA OR VOMITING, #20 TAB 0 Refills


   Prov: Kerry Marx MD         5/7/18 


Nitrofurantoin Monohydrate Macrocrystals (Macrobid) 100 Mg Cap


100 MG PO BID for Infection for 7 Days, #14 CAP 0 Refills


   Prov: Kerry Marx MD         5/7/18


Disposition:  01 DISCHARGE HOME


Condition:  Stable











Kerry Marx MD May 7, 2018 18:20

## 2018-05-08 NOTE — EKG
Date Performed: 05/07/2018       Time Performed: 11:39:36

 

PTAGE:      47 years

 

EKG:      Sinus rhythm 

 

 POSSIBLE LEFT ATRIAL ENLARGEMENT BORDERLINE ECG

 

PREVIOUS TRACING       : 03/13/2016 15.20

 

DOCTOR:   Truman Light  Interpretating Date/Time  05/08/2018 14:15:01